# Patient Record
Sex: FEMALE | Race: WHITE | NOT HISPANIC OR LATINO | Employment: FULL TIME | URBAN - METROPOLITAN AREA
[De-identification: names, ages, dates, MRNs, and addresses within clinical notes are randomized per-mention and may not be internally consistent; named-entity substitution may affect disease eponyms.]

---

## 2023-11-08 DIAGNOSIS — Z36.89 ENCOUNTER FOR FETAL ANATOMIC SURVEY: Primary | ICD-10-CM

## 2023-11-08 DIAGNOSIS — R63.6 PATIENT UNDERWEIGHT: ICD-10-CM

## 2023-11-08 DIAGNOSIS — E03.9 HYPOTHYROIDISM DURING PREGNANCY IN SECOND TRIMESTER: ICD-10-CM

## 2023-11-08 DIAGNOSIS — O99.282 HYPOTHYROIDISM DURING PREGNANCY IN SECOND TRIMESTER: ICD-10-CM

## 2023-12-12 ENCOUNTER — PATIENT MESSAGE (OUTPATIENT)
Dept: MATERNAL FETAL MEDICINE | Facility: CLINIC | Age: 28
End: 2023-12-12
Payer: OTHER GOVERNMENT

## 2023-12-13 ENCOUNTER — PATIENT MESSAGE (OUTPATIENT)
Dept: MATERNAL FETAL MEDICINE | Facility: CLINIC | Age: 28
End: 2023-12-13

## 2023-12-13 ENCOUNTER — OFFICE VISIT (OUTPATIENT)
Dept: MATERNAL FETAL MEDICINE | Facility: CLINIC | Age: 28
End: 2023-12-13
Payer: OTHER GOVERNMENT

## 2023-12-13 ENCOUNTER — PROCEDURE VISIT (OUTPATIENT)
Dept: MATERNAL FETAL MEDICINE | Facility: CLINIC | Age: 28
End: 2023-12-13
Payer: OTHER GOVERNMENT

## 2023-12-13 VITALS
HEIGHT: 68 IN | SYSTOLIC BLOOD PRESSURE: 104 MMHG | BODY MASS INDEX: 18.81 KG/M2 | DIASTOLIC BLOOD PRESSURE: 59 MMHG | WEIGHT: 124.13 LBS

## 2023-12-13 DIAGNOSIS — E03.9 HYPOTHYROID IN PREGNANCY, ANTEPARTUM, SECOND TRIMESTER: ICD-10-CM

## 2023-12-13 DIAGNOSIS — R63.6 PATIENT UNDERWEIGHT: ICD-10-CM

## 2023-12-13 DIAGNOSIS — O99.282 HYPOTHYROID IN PREGNANCY, ANTEPARTUM, SECOND TRIMESTER: ICD-10-CM

## 2023-12-13 DIAGNOSIS — O09.812 PREGNANCY RESULTING FROM IN VITRO FERTILIZATION IN SECOND TRIMESTER: ICD-10-CM

## 2023-12-13 DIAGNOSIS — O99.282 HYPOTHYROIDISM DURING PREGNANCY IN SECOND TRIMESTER: ICD-10-CM

## 2023-12-13 DIAGNOSIS — Z36.89 ENCOUNTER FOR ULTRASOUND TO ASSESS FETAL GROWTH: Primary | ICD-10-CM

## 2023-12-13 DIAGNOSIS — Z36.89 ENCOUNTER FOR FETAL ANATOMIC SURVEY: ICD-10-CM

## 2023-12-13 DIAGNOSIS — E03.9 HYPOTHYROIDISM DURING PREGNANCY IN SECOND TRIMESTER: ICD-10-CM

## 2023-12-13 PROCEDURE — 76811 PR US, OB FETAL EVAL & EXAM, TRANSABDOM,FIRST GESTATION: ICD-10-PCS | Mod: S$GLB,,, | Performed by: OBSTETRICS & GYNECOLOGY

## 2023-12-13 PROCEDURE — 76811 OB US DETAILED SNGL FETUS: CPT | Mod: S$GLB,,, | Performed by: OBSTETRICS & GYNECOLOGY

## 2023-12-13 PROCEDURE — 99204 OFFICE O/P NEW MOD 45 MIN: CPT | Mod: 25,S$GLB,, | Performed by: OBSTETRICS & GYNECOLOGY

## 2023-12-13 PROCEDURE — 99204 PR OFFICE/OUTPT VISIT, NEW, LEVL IV, 45-59 MIN: ICD-10-PCS | Mod: 25,S$GLB,, | Performed by: OBSTETRICS & GYNECOLOGY

## 2023-12-13 RX ORDER — POLYETHYLENE GLYCOL 3350 17 G/17G
POWDER, FOR SOLUTION ORAL
COMMUNITY
Start: 2023-09-19 | End: 2024-12-03

## 2023-12-13 RX ORDER — CLINDAMYCIN PHOSPHATE 10 MG/G
GEL TOPICAL
COMMUNITY
Start: 2023-01-24 | End: 2024-01-17

## 2023-12-13 RX ORDER — LEVOTHYROXINE SODIUM 137 UG/1
TABLET ORAL
COMMUNITY

## 2023-12-13 NOTE — PROGRESS NOTES
Chief complaint: Hypothyroid complicating pregnancy, IVF pregnancy, underweight entering pregnancy    Provider requesting consultation: [facility]    28 y.o. [id number] 19w5d EGKINGA .    PMH:  Past Medical History:  Diagnosis Date   BMI less than 19,adult    Hypothyroidism, unspecified    [place] product of in vitro fertilization (IVF) pregnancy       PObHx:  OB History   Para Term  AB Living  2       1    SAB IAB Ectopic Multiple Live Births        1      # Outcome Date GA Lbr Anand/2nd Weight Sex Delivery Anes PTL Lv  2 Current           1A AB 2023 8w0d         1B AB 2023 8w0d             PSH:History reviewed. No pertinent surgical history.    Family history:family history is not on file.    Social history: reports that she has never smoked. She has never used smokeless tobacco.    A detailed fetal anatomical ultrasound was completed today.  See details in imaging section of EPIC.    In Vitro Fertilization  The use of IVF has been associated with an increase in preeclampsia, gestational hypertension, placental abruption, placenta previa, and risk of  delivery. Due to the potential increased risk of congenital malformations following IVF, a targeted anatomic ultrasound is recommended at 18-20 weeks. In addition, a fetal echocardiogram may be considered if the targeted images are incomplete given the 2-fold associated risk of congenital cardiac anomalies.  While the association between ART and these adverse outcomes raise some concern, it is important to note that the literature is contradictory on this subject and the chances of having a healthy child conceived with ART are overall extremely high.    Recommendations:   Targeted ultrasound for anatomical survey at 18-20 weeks.   Consider fetal echocardiogram at 22 weeks if cardiac views are suboptimal on detailed ultrasound.   Consider low dose aspirin 81 mg daily at 12-16 weeks for preeclampsia risk reduction   Close monitoring for  preeclampsia.   Fetal growth ultrasound at 32-34 weeks   Start weekly antepartum testing at 36 weeks' gestation   Consider delivery between 39 0/7 - 39 6/7 weeks' gestation    Hypothyroidism in pregnancy counseling and recommendations:    In patients with underlying hypothyoidism, ACOG currently recommends following patients with TSH levels every four weeks until replacement therapy corrects the TSH into a normal range.  Because of the unclear association of maternal subclinical hypothyroidism with decreased intelligence testing in their offspring, some experts recommend keeping the TSH level in the lower third of normal.  Once TSH level is in an adequate range, rechecking the TSH level every trimester is indicated.  Reconsult MFM as clinically indicated.    The patient is also seen due to a BMI of 17.  She is 5 ft 8 and weighs 56.3 kg.    As an early teen she had an eating disorder but is pretty much resolved from that.  A hold over is a very high anxiety level.  I took the time to go through a full dietary recall with her today.  She is taking multiple snacks during the day.  Oatmeal or a muffin with peanut butter for breakfast.  She has an intramural snack with ensure,  for lunch she is having a sandwich of either peanut butter and jelly or grilled cheese or tuna salad.  We discussed mercury contain predatory fish and limiting intake to 2 servings a week.  For dinner she has eating a meat serving with vegetable and potato.  She has not been weighing herself but started off at 107 lb and now weighs 124 lb.  I reassured her that for this stage in pregnancy this is a very adequate weight gain.  I suggested that her weight gain should be between 25-35 lb for this pregnancy.  We will set her up with a dietitian for consultation.  She states that she has been a runner since 2015 running approximately 5 miles a day.  She has stopped this in pregnancy because of her prior history of miscarriage with a twin gestation at 8  weeks.  I reassured her that she is in a safer her stage of pregnancy at this point and suggested that she return to lower impact aerobic exercise such as bike or walking.  The patient was given an opportunity to ask questions about management and the diease process.  She expressed an understanding of and agreement to the above impression and plan. All questions were answered to her satisfaction.  She was given contact information to the [facility] clinic to address further concerns.    I spent a total of 45 minutes on the day of the visit. This includes face to face time and non-face to face time preparing to see the patient (eg, review of tests), Obtaining and/or reviewing separately obtained history, Documenting clinical information in the electronic or other health record, Independently interpreting results and communicating results to the patient/family/caregiver, or Care coordination.

## 2023-12-14 ENCOUNTER — PATIENT MESSAGE (OUTPATIENT)
Dept: MATERNAL FETAL MEDICINE | Facility: CLINIC | Age: 28
End: 2023-12-14
Payer: OTHER GOVERNMENT

## 2024-01-24 ENCOUNTER — PROCEDURE VISIT (OUTPATIENT)
Dept: MATERNAL FETAL MEDICINE | Facility: CLINIC | Age: 29
End: 2024-01-24
Payer: OTHER GOVERNMENT

## 2024-01-24 ENCOUNTER — OFFICE VISIT (OUTPATIENT)
Dept: MATERNAL FETAL MEDICINE | Facility: CLINIC | Age: 29
End: 2024-01-24
Payer: OTHER GOVERNMENT

## 2024-01-24 VITALS — DIASTOLIC BLOOD PRESSURE: 65 MMHG | WEIGHT: 132.5 LBS | SYSTOLIC BLOOD PRESSURE: 128 MMHG | BODY MASS INDEX: 20.15 KG/M2

## 2024-01-24 DIAGNOSIS — Z36.89 ENCOUNTER FOR ULTRASOUND TO ASSESS FETAL GROWTH: Primary | ICD-10-CM

## 2024-01-24 DIAGNOSIS — Z36.89 ENCOUNTER FOR ULTRASOUND TO ASSESS FETAL GROWTH: ICD-10-CM

## 2024-01-24 PROCEDURE — 76816 OB US FOLLOW-UP PER FETUS: CPT | Mod: S$GLB,,, | Performed by: OBSTETRICS & GYNECOLOGY

## 2024-01-24 PROCEDURE — 99214 OFFICE O/P EST MOD 30 MIN: CPT | Mod: S$GLB,,, | Performed by: OBSTETRICS & GYNECOLOGY

## 2024-01-24 NOTE — PROGRESS NOTES
Maternal Fetal Medicine follow up consult    SUBJECTIVE:     Ysabel Nation is a 28 y.o.  female with IUP at 25w5d who is seen in follow up consultation by M.  Pregnancy complications include:   No problems updated.    Previous notes reviewed.   No changes to medical, surgical, family, social, or obstetric history.    Interval history since last M visit:  She has gained 25 lb so far this pregnancy.  She has been extremely worried with respect to her weight gain and I reassured her that this is fine.  She is also looked at the changes in her blood pressure as she today has pressure of 128/65 I explained the changes in blood volume and vascular dilatation that progress during pregnancy.  She is also noticed new onset varicosities, also normal for pregnancy.    Medications:  Current Outpatient Medications   Medication Instructions    clindamycin phosphate 1% (CLINDAGEL) 1 % gel Topical (Top), For rosacea    levothyroxine (SYNTHROID) 137 MCG Tab tablet Oral, Before breakfast    polyethylene glycol (GLYCOLAX) 17 gram PwPk Take or use exactly as directed.Dilute before taking.    prenatal vit/iron fum/folic ac (PRENATAL 1+1 ORAL) Oral       Care team members:  Dr. Hernandez - [facility]  She may need a new obstetrician given the changes in provider availability at [facility].     OBJECTIVE:   /65   Wt 60.1 kg (132 lb 7.9 oz)   BMI 20.15 kg/m²         Ultrasound performed. See viewpoint for full ultrasound report.  Normal fetal growth today    Significant labs/imaging:  I reviewed her thyroid function tests which were normal.  She has had an abnormal 1 hour glucose screen of 172 and her 3 hour is pending.    ASSESSMENT/PLAN:     28 y.o.  female with IUP at 25w5d  The patient was given an opportunity to ask questions about management and the diease process.  She expressed an understanding of and agreement to the above impression and plan. All questions were answered to her satisfaction.  She  was given contact information to the [facility] clinic to address further concerns.

## 2024-03-11 ENCOUNTER — PROCEDURE VISIT (OUTPATIENT)
Dept: MATERNAL FETAL MEDICINE | Facility: CLINIC | Age: 29
End: 2024-03-11
Payer: OTHER GOVERNMENT

## 2024-03-11 DIAGNOSIS — Z36.89 ENCOUNTER FOR ULTRASOUND TO ASSESS FETAL GROWTH: ICD-10-CM

## 2024-03-11 PROCEDURE — 76816 OB US FOLLOW-UP PER FETUS: CPT | Mod: S$GLB,,, | Performed by: OBSTETRICS & GYNECOLOGY

## 2024-04-08 ENCOUNTER — PROCEDURE VISIT (OUTPATIENT)
Dept: MATERNAL FETAL MEDICINE | Facility: CLINIC | Age: 29
End: 2024-04-08
Payer: OTHER GOVERNMENT

## 2024-04-08 DIAGNOSIS — Z36.89 ENCOUNTER FOR ULTRASOUND TO ASSESS FETAL GROWTH: ICD-10-CM

## 2024-04-08 DIAGNOSIS — Z36.89 ENCOUNTER FOR ULTRASOUND TO ASSESS FETAL GROWTH: Primary | ICD-10-CM

## 2024-04-08 PROCEDURE — 76819 FETAL BIOPHYS PROFIL W/O NST: CPT | Mod: S$GLB,,, | Performed by: OBSTETRICS & GYNECOLOGY

## 2024-04-15 ENCOUNTER — PROCEDURE VISIT (OUTPATIENT)
Dept: MATERNAL FETAL MEDICINE | Facility: CLINIC | Age: 29
End: 2024-04-15
Payer: OTHER GOVERNMENT

## 2024-04-15 DIAGNOSIS — Z36.89 ENCOUNTER FOR ULTRASOUND TO ASSESS FETAL GROWTH: ICD-10-CM

## 2024-04-15 PROCEDURE — 76816 OB US FOLLOW-UP PER FETUS: CPT | Mod: S$GLB,,, | Performed by: OBSTETRICS & GYNECOLOGY

## 2024-04-15 PROCEDURE — 76819 FETAL BIOPHYS PROFIL W/O NST: CPT | Mod: S$GLB,,, | Performed by: OBSTETRICS & GYNECOLOGY
